# Patient Record
Sex: FEMALE | Race: WHITE | Employment: UNEMPLOYED | URBAN - NONMETROPOLITAN AREA
[De-identification: names, ages, dates, MRNs, and addresses within clinical notes are randomized per-mention and may not be internally consistent; named-entity substitution may affect disease eponyms.]

---

## 2020-09-16 SDOH — HEALTH STABILITY: MENTAL HEALTH: HOW OFTEN DO YOU HAVE A DRINK CONTAINING ALCOHOL?: NEVER

## 2020-09-17 ENCOUNTER — OFFICE VISIT (OUTPATIENT)
Dept: OBGYN CLINIC | Age: 35
End: 2020-09-17
Payer: COMMERCIAL

## 2020-09-17 ENCOUNTER — HOSPITAL ENCOUNTER (OUTPATIENT)
Age: 35
Setting detail: SPECIMEN
Discharge: HOME | End: 2020-09-17
Payer: COMMERCIAL

## 2020-09-17 VITALS
BODY MASS INDEX: 37.99 KG/M2 | SYSTOLIC BLOOD PRESSURE: 98 MMHG | HEIGHT: 65 IN | WEIGHT: 228 LBS | DIASTOLIC BLOOD PRESSURE: 64 MMHG

## 2020-09-17 LAB
ABSOLUTE EOS #: 0.14 K/UL (ref 0–0.44)
ABSOLUTE IMMATURE GRANULOCYTE: 0.04 K/UL (ref 0–0.3)
ABSOLUTE LYMPH #: 2.49 K/UL (ref 1.1–3.7)
ABSOLUTE MONO #: 0.59 K/UL (ref 0.1–1.2)
BASOPHILS # BLD: 1 % (ref 0–2)
BASOPHILS ABSOLUTE: 0.04 K/UL (ref 0–0.2)
DIFFERENTIAL TYPE: ABNORMAL
EOSINOPHILS RELATIVE PERCENT: 2 % (ref 1–4)
HCT VFR BLD CALC: 39.4 % (ref 36.3–47.1)
HEMOGLOBIN: 13 G/DL (ref 11.9–15.1)
IMMATURE GRANULOCYTES: 1 %
LYMPHOCYTES # BLD: 32 % (ref 24–43)
MCH RBC QN AUTO: 27.8 PG (ref 25.2–33.5)
MCHC RBC AUTO-ENTMCNC: 33 G/DL (ref 28.4–34.8)
MCV RBC AUTO: 84.4 FL (ref 82.6–102.9)
MONOCYTES # BLD: 8 % (ref 3–12)
NRBC AUTOMATED: 0 PER 100 WBC
PDW BLD-RTO: 13 % (ref 11.8–14.4)
PLATELET # BLD: 265 K/UL (ref 138–453)
PLATELET ESTIMATE: ABNORMAL
PMV BLD AUTO: 10 FL (ref 8.1–13.5)
RBC # BLD: 4.67 M/UL (ref 3.95–5.11)
RBC # BLD: ABNORMAL 10*6/UL
SEG NEUTROPHILS: 56 % (ref 36–65)
SEGMENTED NEUTROPHILS ABSOLUTE COUNT: 4.59 K/UL (ref 1.5–8.1)
WBC # BLD: 7.9 K/UL (ref 3.5–11.3)
WBC # BLD: ABNORMAL 10*3/UL

## 2020-09-17 PROCEDURE — 99395 PREV VISIT EST AGE 18-39: CPT | Performed by: OBSTETRICS & GYNECOLOGY

## 2020-09-17 RX ORDER — METFORMIN HYDROCHLORIDE 750 MG/1
TABLET, EXTENDED RELEASE ORAL
COMMUNITY
Start: 2020-06-24

## 2020-09-17 RX ORDER — CITALOPRAM 20 MG/1
20 TABLET ORAL DAILY
COMMUNITY
Start: 2020-09-16

## 2020-09-17 RX ORDER — TOPIRAMATE 25 MG/1
25 TABLET ORAL 2 TIMES DAILY
COMMUNITY

## 2020-09-17 RX ORDER — FAMOTIDINE 20 MG/1
20 TABLET, FILM COATED ORAL DAILY
COMMUNITY

## 2020-09-17 RX ORDER — CETIRIZINE HYDROCHLORIDE 10 MG/1
TABLET ORAL
COMMUNITY
Start: 2020-08-17

## 2020-09-17 RX ORDER — BUPROPION HYDROCHLORIDE 300 MG/1
300 TABLET ORAL DAILY
COMMUNITY
Start: 2020-09-16 | End: 2020-09-17

## 2020-09-17 RX ORDER — SUMATRIPTAN 25 MG/1
TABLET, FILM COATED ORAL
COMMUNITY
Start: 2020-09-08

## 2020-09-17 RX ORDER — MONTELUKAST SODIUM 10 MG/1
TABLET ORAL
COMMUNITY
Start: 2020-07-06 | End: 2020-12-28 | Stop reason: ALTCHOICE

## 2020-09-17 RX ORDER — NICOTINE POLACRILEX 4 MG/1
GUM, CHEWING ORAL
COMMUNITY
Start: 2020-09-04 | End: 2020-12-28 | Stop reason: ALTCHOICE

## 2020-09-17 RX ORDER — UREA 10 %
LOTION (ML) TOPICAL
COMMUNITY
Start: 2020-06-22

## 2020-09-17 RX ORDER — ONDANSETRON 4 MG/1
TABLET, FILM COATED ORAL
COMMUNITY
Start: 2020-09-08

## 2020-09-17 NOTE — PROGRESS NOTES
DATE OF VISIT:  20  PATIENT NAME:  Fidel Moffett     YOB: 1985    37 y.o. Chief Complaint   Patient presents with    Annual Exam     last pap 2019 NL/Neg HPV. Pt. c/o migraines the week before her period starts.  Irregular Menses     Pt. c/o heavy periods that are painful. Patient's last menstrual period was 2020. Primary Care Physician: Alexander Knox    The patient was seen and examined. She has no chiefcomplaint today and is here for her annual exam.  Her bowels are regular. There are no voiding complaints. She denies any bloating. She denies vaginal discharge and was counseled on STD's and the need for barriercontraception.      HPI : Fidel Moffett is a 37 y.o. female [place] who presents today for her annual.    ______________________________________________________________________    OB History    Para Term  AB Living   0 0 0 0 0 0   SAB TAB Ectopic Molar Multiple Live Births   0 0 0 0 0 0     Past Medical History:   Diagnosis Date    Acid reflux     Anxiety     Depression     IBS (irritable bowel syndrome)     Migraines     Psoriasis     Vertigo     Vitamin deficiency                                                                    Past Surgical History:   Procedure Laterality Date    CHOLECYSTECTOMY  2013    WISDOM TOOTH EXTRACTION       Family History   Problem Relation Age of Onset    Diabetes Father     Lung Cancer Maternal Grandfather     Brain Cancer Maternal Uncle      Social History     Socioeconomic History    Marital status:      Spouse name: Not on file    Number of children: Not on file    Years of education: Not on file    Highest education level: Not on file   Occupational History    Not on file   Social Needs    Financial resource strain: Not on file    Food insecurity     Worry: Not on file     Inability: Not on file    Transportation needs     Medical: Not on file     Non-medical: Not on file Tobacco Use    Smoking status: Never Smoker    Smokeless tobacco: Never Used   Substance and Sexual Activity    Alcohol use: Never     Frequency: Never    Drug use: Never    Sexual activity: Yes     Partners: Male   Lifestyle    Physical activity     Days per week: Not on file     Minutes per session: Not on file    Stress: Not on file   Relationships    Social connections     Talks on phone: Not on file     Gets together: Not on file     Attends Jewish service: Not on file     Active member of club or organization: Not on file     Attends meetings of clubs or organizations: Not on file     Relationship status: Not on file    Intimate partner violence     Fear of current or ex partner: Not on file     Emotionally abused: Not on file     Physically abused: Not on file     Forced sexual activity: Not on file   Other Topics Concern    Not on file   Social History Narrative    Not on file     Vitals:    09/17/20 1625   BP: 98/64   Site: Right Upper Arm   Position: Sitting   Weight: 228 lb (103.4 kg)   Height: 5' 5\" (1.651 m)     Body mass index is 37.94 kg/m². Patient's last menstrual period was 09/02/2020. MEDICATIONS:  Current Outpatient Medications   Medication Sig Dispense Refill    cetirizine (ZYRTEC) 10 MG tablet TK 1 T PO BID      Cyanocobalamin (VITAMIN B12) 1000 MCG TBCR TK 1 T PO D      famotidine (PEPCID) 20 MG tablet Take 20 mg by mouth daily      melatonin 1 MG tablet TAKE 1 TABLET(1 MG) BY MOUTH EVERY NIGHT      metFORMIN (GLUCOPHAGE-XR) 750 MG extended release tablet TK 1 T PO BID      montelukast (SINGULAIR) 10 MG tablet TK 1 T PO QPM      omeprazole 20 MG EC tablet TK 1 T PO D      ondansetron (ZOFRAN) 4 MG tablet TK 1 T PO Q 8 H PRF NAUSEA OR VOM      topiramate (TOPAMAX) 25 MG tablet Take 25 mg by mouth 2 times daily      citalopram (CELEXA) 20 MG tablet Take 20 mg by mouth daily      SUMAtriptan (IMITREX) 25 MG tablet TK 1 T PO D PRF MIGRAINE HEADACHE.  MAY REPEAT DOSE AFTER 2 H UP TO A. MAXIMUM  MG IN 24 H      Norgestimate-Eth Estradiol (SPRINTEC 28 PO) Take by mouth      BUPROPION HCL ER, SR, PO Take by mouth      DOXEPIN HCL PO Take by mouth      PRAZOSIN HCL PO Take by mouth       No current facility-administered medications for this visit. ALLERGIES:  Allergies as of 09/17/2020 - Review Complete 09/17/2020   Allergen Reaction Noted    Nsaids  09/17/2020           Symptoms of decreased mood absent - is on medication for anxiety    **If either question is answered in a  positive fashion then completethe PHQ9 Scoring Evaluation and make the appropriate referral**      Gynecologic History:        Patient's last menstrual period was 09/02/2020. Sexually Active: Yes    STD History: No     Permanent Sterilization:No   Reversible Birth Control: Yes was put on ocp for menorrhagia - symptoms better but still not controlled        Hormone Replacement Exposure: No      Genetic Qualified Family History of Breast, Ovarian , Colon or Uterine Cancer:No   If YES see scanned worksheet. Preventative Health Testing:  Colposcopy History:   Date of Last Mammogram: na  Date of Last Colonoscopy:   Date of Last Bone Density:      ______________________________________________________________________    REVIEW OF SYSTEMS:       Review of Systems   Genitourinary: Positive for menstrual problem (heavy with clotting despite ocp) and pelvic pain (cramping wtih cycle). Neurological: Positive for headaches (before and during menses x last 2 years). PHYSICAL EXAM:    Physical Exam  Constitutional:       Appearance: She is well-developed. HENT:      Head: Normocephalic and atraumatic. Eyes:      Pupils: Pupils are equal, round, and reactive to light. Neck:      Musculoskeletal: Normal range of motion and neck supple. Cardiovascular:      Rate and Rhythm: Normal rate.    Pulmonary:      Effort: Pulmonary effort is normal.   Chest:      Breasts: Breasts are symmetrical. Right: Normal. No inverted nipple, mass, nipple discharge, skin change or tenderness. Left: Normal. No inverted nipple, mass, nipple discharge, skin change or tenderness. Abdominal:      General: There is no distension. Palpations: Abdomen is soft. There is no mass. Tenderness: There is no abdominal tenderness. There is no guarding or rebound. Genitourinary:     General: Normal vulva. Exam position: Lithotomy position. Vagina: Normal.      Cervix: Normal.      Uterus: Normal.       Adnexa: Right adnexa normal and left adnexa normal.   Musculoskeletal: Normal range of motion. Skin:     General: Skin is warm and dry. Neurological:      Mental Status: She is alert and oriented to person, place, and time. Psychiatric:         Behavior: Behavior normal.         Thought Content: Thought content normal.         Judgment: Judgment normal.         POC Cultures:  No results found for this visit on 09/17/20. ASSESSMENT:      37 y.o. Annual  1. Women's annual routine gynecological examination    2. Menorrhagia with regular cycle          There are no active problems to display for this patient. Hereditary Breast, Ovarian, Colon and Uterine Cancer screening Done. Tobacco & Secondary smoke risks reviewed; instructed on cessation and avoidance    PLAN:    Return in about 2 weeks (around 10/1/2020) for usn, follow up. Orders Placed This Encounter   Procedures    CBC With Auto Differential       Repeat Annual every 1 year  Cervical Cytology Evaluation begins at 24years old. If Negative Cytology, Follow-up screening per current guidelines. Mammograms every 1year. If 37 yo and last mammogram was negative. Calcium and Vitamin D dosing reviewed. Colonoscopy screening reviewed as well as onset for bone density testing. Birth control and barrier recommendationsdiscussed. STD counseling and prevention reviewed.   Gardisil counseling completed for all patients 7-35 yo. Routine healthmaintenance per patients PCP.     Electronicallysigned by:  Eunice Aguiar DO on 09/17/20

## 2020-10-15 ENCOUNTER — TELEPHONE (OUTPATIENT)
Dept: OBGYN CLINIC | Age: 35
End: 2020-10-15

## 2020-10-15 NOTE — TELEPHONE ENCOUNTER
Left voicemail to remind her of ultrasound on 10-19-20 at 2:30pm and a provider appointment with Levar Prather at 3:15pm.

## 2020-10-19 ENCOUNTER — OFFICE VISIT (OUTPATIENT)
Dept: OBGYN CLINIC | Age: 35
End: 2020-10-19
Payer: COMMERCIAL

## 2020-10-19 ENCOUNTER — TELEPHONE (OUTPATIENT)
Dept: OBGYN CLINIC | Age: 35
End: 2020-10-19

## 2020-10-19 VITALS
SYSTOLIC BLOOD PRESSURE: 124 MMHG | HEIGHT: 65 IN | WEIGHT: 227 LBS | BODY MASS INDEX: 37.82 KG/M2 | DIASTOLIC BLOOD PRESSURE: 72 MMHG

## 2020-10-19 PROBLEM — F33.1 MODERATE EPISODE OF RECURRENT MAJOR DEPRESSIVE DISORDER (HCC): Status: ACTIVE | Noted: 2019-06-12

## 2020-10-19 PROBLEM — F43.10 POST TRAUMATIC STRESS DISORDER (PTSD): Status: ACTIVE | Noted: 2019-06-12

## 2020-10-19 PROCEDURE — 99213 OFFICE O/P EST LOW 20 MIN: CPT | Performed by: ADVANCED PRACTICE MIDWIFE

## 2020-10-19 RX ORDER — ERGOCALCIFEROL 1.25 MG/1
CAPSULE ORAL
COMMUNITY
Start: 2020-10-18

## 2020-10-19 RX ORDER — NORETHINDRONE ACETATE AND ETHINYL ESTRADIOL 1.5-30(21)
1 KIT ORAL DAILY
Qty: 1 PACKET | Refills: 12 | Status: ON HOLD | OUTPATIENT
Start: 2020-10-19 | End: 2021-01-08 | Stop reason: HOSPADM

## 2020-10-19 ASSESSMENT — ENCOUNTER SYMPTOMS
GASTROINTESTINAL NEGATIVE: 1
RESPIRATORY NEGATIVE: 1

## 2020-10-19 NOTE — PROGRESS NOTES
PROBLEM VISIT     Date of service: 10/19/2020    Sindy Montgomery  Is a 37 y.o.  female    PT's PCP is: Arcelia Anna     : 1985                                          Review of Systems   Constitutional: Negative. HENT: Negative. Respiratory: Negative. Cardiovascular: Negative. Gastrointestinal: Negative. Genitourinary: Positive for menstrual problem (Heavy menses, dysmenorrhea) and pelvic pain. Musculoskeletal: Negative. Neurological: Positive for headaches (Menstrual headaches). Psychiatric/Behavioral: Negative. Patient's last menstrual period was 2020.    OB History    Para Term  AB Living   0 0 0 0 0 0   SAB TAB Ectopic Molar Multiple Live Births   0 0 0 0 0 0        Social History     Tobacco Use   Smoking Status Never Smoker   Smokeless Tobacco Never Used        Social History     Substance and Sexual Activity   Alcohol Use Never    Frequency: Never       Allergies: Nsaids      Current Outpatient Medications:     norethindrone-ethinyl estradiol-iron (LOESTRIN FE ) 1.5-30 MG-MCG tablet, Take 1 tablet by mouth daily, Disp: 1 packet, Rfl: 12    cetirizine (ZYRTEC) 10 MG tablet, TK 1 T PO BID, Disp: , Rfl:     Cyanocobalamin (VITAMIN B12) 1000 MCG TBCR, TK 1 T PO D, Disp: , Rfl:     famotidine (PEPCID) 20 MG tablet, Take 20 mg by mouth daily, Disp: , Rfl:     melatonin 1 MG tablet, TAKE 1 TABLET(1 MG) BY MOUTH EVERY NIGHT, Disp: , Rfl:     metFORMIN (GLUCOPHAGE-XR) 750 MG extended release tablet, TK 1 T PO BID, Disp: , Rfl:     montelukast (SINGULAIR) 10 MG tablet, TK 1 T PO QPM, Disp: , Rfl:     omeprazole 20 MG EC tablet, TK 1 T PO D, Disp: , Rfl:     ondansetron (ZOFRAN) 4 MG tablet, TK 1 T PO Q 8 H PRF NAUSEA OR VOM, Disp: , Rfl:     topiramate (TOPAMAX) 25 MG tablet, Take 25 mg by mouth 2 times daily, Disp: , Rfl:     citalopram (CELEXA) 20 MG tablet, Take 20 mg by mouth daily, Disp: , Rfl:     SUMAtriptan (IMITREX) 25 MG tablet, TK 1 T PO D PRF MIGRAINE HEADACHE. MAY REPEAT DOSE AFTER 2 H UP TO A. MAXIMUM  MG IN 24 H, Disp: , Rfl:     Norgestimate-Eth Estradiol (SPRINTEC 28 PO), Take by mouth, Disp: , Rfl:     BUPROPION HCL ER, SR, PO, Take by mouth, Disp: , Rfl:     DOXEPIN HCL PO, Take by mouth, Disp: , Rfl:     PRAZOSIN HCL PO, Take by mouth, Disp: , Rfl:     vitamin D (ERGOCALCIFEROL) 1.25 MG (40867 UT) CAPS capsule, , Disp: , Rfl:     Social History     Substance and Sexual Activity   Sexual Activity Yes    Partners: Male       Last Yearly:  2020      Chief Complaint   Patient presents with    Menstrual Problem     Pt. c/o heavy painful periods. Periods occur reguarly. At times bleeding is so heavy that she cannot leave the house. Can bleed through a super plus tampon ever few hours. Also has cramping with her period. Periods are also a migraine trigger for her. Physical Exam  Constitutional:       Appearance: Normal appearance. HENT:      Head: Normocephalic. Eyes:      Pupils: Pupils are equal, round, and reactive to light. Neck:      Musculoskeletal: Normal range of motion. Pulmonary:      Effort: Pulmonary effort is normal.   Musculoskeletal: Normal range of motion. Neurological:      Mental Status: She is alert and oriented to person, place, and time. Skin:     General: Skin is warm. Psychiatric:         Behavior: Behavior normal.   Vitals signs and nursing note reviewed. Vital Signs  Blood pressure 124/72, height 5' 5\" (1.651 m), weight 227 lb (103 kg), last menstrual period 09/30/2020. HPI USN f/u which was ordered by Dr Gail Kaba. Patient reports very heavy menses that are also very painful. Ongoing issue. Has minimal relief with OCP use.        Yes PT denies fever, chills, nausea and vomiting                          Results reviewed today:    USN reviewed - normal uterine size, endo lining appropriate for day #20 scan, small uterine fibroid, normal ovaries    Assessment and Plan          Diagnosis Orders   1. Dysmenorrhea     2. Menorrhagia with regular cycle       Discussed options for treatment - increase OCP to one with higher hormone component - she is agreeable, reviewed ACHES and risks. Also discussed surgery as patient \"never wants children and neither does \". Patient would like ablation to try to alleviate her s/s and is aware that recommend tubal ligation with this due to increased risk of uterine rupture. Patient is agreeable. Aware that Eunice Dakins will call her to schedule for 2021        I am having Ragini Hong start on norethindrone-ethinyl estradiol-iron. I am also having her maintain her Norgestimate-Eth Estradiol (SPRINTEC 28 PO), (BUPROPION HCL ER, SR, PO), DOXEPIN HCL PO, PRAZOSIN HCL PO, cetirizine, Vitamin B12, famotidine, melatonin, metFORMIN, montelukast, omeprazole, ondansetron, topiramate, citalopram, SUMAtriptan, and vitamin D. Return if symptoms worsen or fail to improve. There are no Patient Instructions on file for this visit. Over 50% of time spent on counseling and care coordination on: see assessment and plan,  She was also counseled on her preventative health maintenance recommendations and follow-up.         FF time: 15 min      DAMION NATARAJAN,10/19/2020 3:05 PM       Electronically signed by NUNU Gil CNM

## 2020-11-05 NOTE — TELEPHONE ENCOUNTER
Spoke to patient, she is scheduled for a Lap b/l Salpingectomy, Jie Pulido at [facility] on 1/8/21. She is aware that she will come in to see Dr. Iisah Harrell prior to surgery and will get labs at that time. We will also follow up approx 4 weeks after surgery. She is a [employer] [facility], so no work note needed. She is aware that I will arrange a nurse from [facility] to call her to complete a phone interview and arrange COVID testing. Appointments scheduled. Patient verbalized understanding, no further questions/concerns voiced.

## 2020-12-28 NOTE — PROGRESS NOTES
Patient instructed on the pre-operative, intra-operative, and post-operative process. Patient instructed on NPO status. Medication instructions and Pre operative instruction sheet reviewed over the phone. Instructed pt to stop taking biotin 7 days prior to surgery. Instructed pt to take topamax, bupropion, celexa, and pepcid with a small sip of water prior to arriving to the hospital the day of surgery.

## 2021-01-04 ENCOUNTER — HOSPITAL ENCOUNTER (OUTPATIENT)
Dept: PREADMISSION TESTING | Age: 36
Setting detail: SPECIMEN
Discharge: HOME | End: 2021-01-08
Payer: COMMERCIAL

## 2021-01-04 ENCOUNTER — HOSPITAL ENCOUNTER (OUTPATIENT)
Age: 36
Discharge: HOME | End: 2021-01-04
Payer: COMMERCIAL

## 2021-01-04 DIAGNOSIS — Z01.818 PREOPERATIVE TESTING: ICD-10-CM

## 2021-01-04 DIAGNOSIS — R10.2 PELVIC PAIN IN FEMALE: ICD-10-CM

## 2021-01-04 DIAGNOSIS — N94.6 DYSMENORRHEA: ICD-10-CM

## 2021-01-04 DIAGNOSIS — Z01.818 PREOPERATIVE TESTING: Primary | ICD-10-CM

## 2021-01-04 DIAGNOSIS — N92.0 MENORRHAGIA WITH REGULAR CYCLE: ICD-10-CM

## 2021-01-04 LAB
ABSOLUTE EOS #: 0.15 K/UL (ref 0–0.44)
ABSOLUTE IMMATURE GRANULOCYTE: 0.04 K/UL (ref 0–0.3)
ABSOLUTE LYMPH #: 2.16 K/UL (ref 1.1–3.7)
ABSOLUTE MONO #: 0.57 K/UL (ref 0.1–1.2)
BASOPHILS # BLD: 0 % (ref 0–2)
BASOPHILS ABSOLUTE: 0.04 K/UL (ref 0–0.2)
DIFFERENTIAL TYPE: ABNORMAL
EOSINOPHILS RELATIVE PERCENT: 2 % (ref 1–4)
HCG QUALITATIVE: NEGATIVE
HCT VFR BLD CALC: 38.8 % (ref 36.3–47.1)
HEMOGLOBIN: 12.7 G/DL (ref 11.9–15.1)
IMMATURE GRANULOCYTES: 0 %
LYMPHOCYTES # BLD: 24 % (ref 24–43)
MCH RBC QN AUTO: 27.7 PG (ref 25.2–33.5)
MCHC RBC AUTO-ENTMCNC: 32.7 G/DL (ref 28.4–34.8)
MCV RBC AUTO: 84.5 FL (ref 82.6–102.9)
MONOCYTES # BLD: 6 % (ref 3–12)
NRBC AUTOMATED: 0 PER 100 WBC
PDW BLD-RTO: 12.8 % (ref 11.8–14.4)
PLATELET # BLD: 225 K/UL (ref 138–453)
PLATELET ESTIMATE: ABNORMAL
PMV BLD AUTO: 9.4 FL (ref 8.1–13.5)
RBC # BLD: 4.59 M/UL (ref 3.95–5.11)
RBC # BLD: ABNORMAL 10*6/UL
SEG NEUTROPHILS: 68 % (ref 36–65)
SEGMENTED NEUTROPHILS ABSOLUTE COUNT: 6.09 K/UL (ref 1.5–8.1)
WBC # BLD: 9.1 K/UL (ref 3.5–11.3)
WBC # BLD: ABNORMAL 10*3/UL

## 2021-01-04 PROCEDURE — 36415 COLL VENOUS BLD VENIPUNCTURE: CPT

## 2021-01-04 PROCEDURE — 85025 COMPLETE CBC W/AUTO DIFF WBC: CPT

## 2021-01-04 PROCEDURE — 84703 CHORIONIC GONADOTROPIN ASSAY: CPT

## 2021-01-04 PROCEDURE — U0003 INFECTIOUS AGENT DETECTION BY NUCLEIC ACID (DNA OR RNA); SEVERE ACUTE RESPIRATORY SYNDROME CORONAVIRUS 2 (SARS-COV-2) (CORONAVIRUS DISEASE [COVID-19]), AMPLIFIED PROBE TECHNIQUE, MAKING USE OF HIGH THROUGHPUT TECHNOLOGIES AS DESCRIBED BY CMS-2020-01-R: HCPCS

## 2021-01-04 PROCEDURE — C9803 HOPD COVID-19 SPEC COLLECT: HCPCS

## 2021-01-05 LAB
SARS-COV-2, RAPID: NORMAL
SARS-COV-2: NORMAL
SARS-COV-2: NOT DETECTED
SOURCE: NORMAL

## 2021-01-06 ENCOUNTER — TELEPHONE (OUTPATIENT)
Dept: PRIMARY CARE CLINIC | Age: 36
End: 2021-01-06

## 2021-01-06 ENCOUNTER — OFFICE VISIT (OUTPATIENT)
Dept: OBGYN CLINIC | Age: 36
End: 2021-01-06
Payer: COMMERCIAL

## 2021-01-06 VITALS
BODY MASS INDEX: 38.32 KG/M2 | HEIGHT: 65 IN | HEART RATE: 97 BPM | SYSTOLIC BLOOD PRESSURE: 119 MMHG | DIASTOLIC BLOOD PRESSURE: 82 MMHG | WEIGHT: 230 LBS

## 2021-01-06 DIAGNOSIS — N92.0 MENORRHAGIA WITH REGULAR CYCLE: Primary | ICD-10-CM

## 2021-01-06 DIAGNOSIS — N94.6 DYSMENORRHEA: ICD-10-CM

## 2021-01-06 PROCEDURE — 99213 OFFICE O/P EST LOW 20 MIN: CPT | Performed by: OBSTETRICS & GYNECOLOGY

## 2021-01-06 NOTE — TELEPHONE ENCOUNTER
Tried to call patient re-guarding negative COVID-19 test result, but phone was not working this day.

## 2021-01-06 NOTE — PROGRESS NOTES
DATE OF VISIT:  1/6/21    PATIENT NAME:  Nitza Hong     YOB: 1985    REASON FOR VISIT:    Chief Complaint   Patient presents with    Pre-op Exam     Pt. is scheduled on 1-8-2021 for lap b/l salpingectomy, hyst D&C, novasure.  Menstrual Problem     c/o heavy painful periods. At times bleeding is so heavy that she she feels unable to leave the house. Can bleed through a super plus tampon every few hours. Has cramping with her period. Also gets some sharp pains that can be 7 out of 10 on pain scale. HISTORY OF PRESENT ILLNESS:  Pt with painful, heavy menses - using super plus tampon q 1- 2 hours; disc'd hysteroscopy d&c with novasure and need for medically indicated b/l salpingectomy; r/b/a disc'd consent obtained      Patient's last menstrual period was 12/23/2020 (exact date). Vitals:    01/06/21 1542   BP: 119/82   Site: Right Upper Arm   Position: Sitting   Pulse: 97   Weight: 230 lb (104.3 kg)   Height: 5' 5\" (1.651 m)     Body mass index is 38.27 kg/m². Allergies   Allergen Reactions    Nsaids      Pt.  Has IBS     Current Outpatient Medications   Medication Sig Dispense Refill    BIOTIN PO Take by mouth daily      vitamin D (ERGOCALCIFEROL) 1.25 MG (67576 UT) CAPS capsule       norethindrone-ethinyl estradiol-iron (LOESTRIN FE 1.5/30) 1.5-30 MG-MCG tablet Take 1 tablet by mouth daily 1 packet 12    cetirizine (ZYRTEC) 10 MG tablet TK 1 T PO BID      Cyanocobalamin (VITAMIN B12) 1000 MCG TBCR TK 1 T PO D      famotidine (PEPCID) 20 MG tablet Take 20 mg by mouth daily      melatonin 1 MG tablet TAKE 1 TABLET(1 MG) BY MOUTH EVERY NIGHT      metFORMIN (GLUCOPHAGE-XR) 750 MG extended release tablet TK 1 T PO BID      ondansetron (ZOFRAN) 4 MG tablet TK 1 T PO Q 8 H PRF NAUSEA OR VOM      topiramate (TOPAMAX) 25 MG tablet Take 25 mg by mouth 2 times daily      citalopram (CELEXA) 20 MG tablet Take 20 mg by mouth daily /82 (Site: Right Upper Arm, Position: Sitting)   Pulse 97   Ht 5' 5\" (1.651 m)   Wt 230 lb (104.3 kg)   LMP 12/23/2020 (Exact Date)   BMI 38.27 kg/m²   Physical Exam  Constitutional:       Appearance: Normal appearance. HENT:      Head: Normocephalic and atraumatic. Eyes:      Pupils: Pupils are equal, round, and reactive to light. Neck:      Musculoskeletal: Normal range of motion. Cardiovascular:      Rate and Rhythm: Normal rate. Musculoskeletal: Normal range of motion. Neurological:      Mental Status: She is alert and oriented to person, place, and time. Skin:     General: Skin is warm and dry. Psychiatric:         Mood and Affect: Mood normal.         Behavior: Behavior normal.         Thought Content: Thought content normal.         Judgment: Judgment normal.         ASSESSMENT:      1. Menorrhagia with regular cycle    2. Dysmenorrhea        PLAN:  No orders of the defined types were placed in this encounter. No follow-ups on file.        Electronically signed by Nick Gatica DO on 01/06/21

## 2021-01-07 ENCOUNTER — ANESTHESIA EVENT (OUTPATIENT)
Dept: OPERATING ROOM | Age: 36
End: 2021-01-07
Payer: COMMERCIAL

## 2021-01-08 ENCOUNTER — HOSPITAL ENCOUNTER (OUTPATIENT)
Age: 36
Setting detail: OUTPATIENT SURGERY
Discharge: HOME | End: 2021-01-08
Attending: OBSTETRICS & GYNECOLOGY | Admitting: OBSTETRICS & GYNECOLOGY
Payer: COMMERCIAL

## 2021-01-08 ENCOUNTER — ANESTHESIA (OUTPATIENT)
Dept: OPERATING ROOM | Age: 36
End: 2021-01-08
Payer: COMMERCIAL

## 2021-01-08 VITALS
DIASTOLIC BLOOD PRESSURE: 75 MMHG | HEIGHT: 65 IN | OXYGEN SATURATION: 97 % | TEMPERATURE: 98.2 F | WEIGHT: 226 LBS | SYSTOLIC BLOOD PRESSURE: 133 MMHG | RESPIRATION RATE: 16 BRPM | HEART RATE: 96 BPM | BODY MASS INDEX: 37.65 KG/M2

## 2021-01-08 VITALS — OXYGEN SATURATION: 98 % | DIASTOLIC BLOOD PRESSURE: 35 MMHG | SYSTOLIC BLOOD PRESSURE: 101 MMHG

## 2021-01-08 DIAGNOSIS — G89.18 POST-OP PAIN: Primary | ICD-10-CM

## 2021-01-08 PROCEDURE — 64488 TAP BLOCK BI INJECTION: CPT | Performed by: NURSE ANESTHETIST, CERTIFIED REGISTERED

## 2021-01-08 PROCEDURE — 2500000003 HC RX 250 WO HCPCS: Performed by: NURSE ANESTHETIST, CERTIFIED REGISTERED

## 2021-01-08 PROCEDURE — 7100000001 HC PACU RECOVERY - ADDTL 15 MIN: Performed by: OBSTETRICS & GYNECOLOGY

## 2021-01-08 PROCEDURE — 7100000011 HC PHASE II RECOVERY - ADDTL 15 MIN: Performed by: OBSTETRICS & GYNECOLOGY

## 2021-01-08 PROCEDURE — 6360000002 HC RX W HCPCS: Performed by: NURSE ANESTHETIST, CERTIFIED REGISTERED

## 2021-01-08 PROCEDURE — 6360000002 HC RX W HCPCS: Performed by: OBSTETRICS & GYNECOLOGY

## 2021-01-08 PROCEDURE — 2580000003 HC RX 258: Performed by: OBSTETRICS & GYNECOLOGY

## 2021-01-08 PROCEDURE — 58563 HYSTEROSCOPY ABLATION: CPT | Performed by: OBSTETRICS & GYNECOLOGY

## 2021-01-08 PROCEDURE — 3600000003 HC SURGERY LEVEL 3 BASE: Performed by: OBSTETRICS & GYNECOLOGY

## 2021-01-08 PROCEDURE — 3700000000 HC ANESTHESIA ATTENDED CARE: Performed by: OBSTETRICS & GYNECOLOGY

## 2021-01-08 PROCEDURE — 3600000013 HC SURGERY LEVEL 3 ADDTL 15MIN: Performed by: OBSTETRICS & GYNECOLOGY

## 2021-01-08 PROCEDURE — 2720000010 HC SURG SUPPLY STERILE: Performed by: OBSTETRICS & GYNECOLOGY

## 2021-01-08 PROCEDURE — 3700000001 HC ADD 15 MINUTES (ANESTHESIA): Performed by: OBSTETRICS & GYNECOLOGY

## 2021-01-08 PROCEDURE — 88302 TISSUE EXAM BY PATHOLOGIST: CPT

## 2021-01-08 PROCEDURE — 2709999900 HC NON-CHARGEABLE SUPPLY: Performed by: OBSTETRICS & GYNECOLOGY

## 2021-01-08 PROCEDURE — 6370000000 HC RX 637 (ALT 250 FOR IP): Performed by: OBSTETRICS & GYNECOLOGY

## 2021-01-08 PROCEDURE — 7100000000 HC PACU RECOVERY - FIRST 15 MIN: Performed by: OBSTETRICS & GYNECOLOGY

## 2021-01-08 PROCEDURE — 88305 TISSUE EXAM BY PATHOLOGIST: CPT

## 2021-01-08 PROCEDURE — 58661 LAPAROSCOPY REMOVE ADNEXA: CPT | Performed by: OBSTETRICS & GYNECOLOGY

## 2021-01-08 PROCEDURE — 7100000010 HC PHASE II RECOVERY - FIRST 15 MIN: Performed by: OBSTETRICS & GYNECOLOGY

## 2021-01-08 PROCEDURE — 6370000000 HC RX 637 (ALT 250 FOR IP): Performed by: NURSE ANESTHETIST, CERTIFIED REGISTERED

## 2021-01-08 RX ORDER — HYDROCODONE BITARTRATE AND ACETAMINOPHEN 5; 325 MG/1; MG/1
2 TABLET ORAL PRN
Status: COMPLETED | OUTPATIENT
Start: 2021-01-08 | End: 2021-01-08

## 2021-01-08 RX ORDER — LIDOCAINE HYDROCHLORIDE 20 MG/ML
INJECTION, SOLUTION EPIDURAL; INFILTRATION; INTRACAUDAL; PERINEURAL PRN
Status: DISCONTINUED | OUTPATIENT
Start: 2021-01-08 | End: 2021-01-08 | Stop reason: SDUPTHER

## 2021-01-08 RX ORDER — FENTANYL CITRATE 50 UG/ML
50 INJECTION, SOLUTION INTRAMUSCULAR; INTRAVENOUS EVERY 5 MIN PRN
Status: DISCONTINUED | OUTPATIENT
Start: 2021-01-08 | End: 2021-01-08 | Stop reason: HOSPADM

## 2021-01-08 RX ORDER — DEXAMETHASONE SODIUM PHOSPHATE 4 MG/ML
INJECTION, SOLUTION INTRA-ARTICULAR; INTRALESIONAL; INTRAMUSCULAR; INTRAVENOUS; SOFT TISSUE PRN
Status: DISCONTINUED | OUTPATIENT
Start: 2021-01-08 | End: 2021-01-08 | Stop reason: SDUPTHER

## 2021-01-08 RX ORDER — MIDAZOLAM HYDROCHLORIDE 1 MG/ML
INJECTION INTRAMUSCULAR; INTRAVENOUS PRN
Status: DISCONTINUED | OUTPATIENT
Start: 2021-01-08 | End: 2021-01-08 | Stop reason: SDUPTHER

## 2021-01-08 RX ORDER — ROCURONIUM BROMIDE 10 MG/ML
INJECTION, SOLUTION INTRAVENOUS PRN
Status: DISCONTINUED | OUTPATIENT
Start: 2021-01-08 | End: 2021-01-08 | Stop reason: SDUPTHER

## 2021-01-08 RX ORDER — DEXAMETHASONE SODIUM PHOSPHATE 10 MG/ML
INJECTION, SOLUTION INTRAMUSCULAR; INTRAVENOUS PRN
Status: DISCONTINUED | OUTPATIENT
Start: 2021-01-08 | End: 2021-01-08 | Stop reason: SDUPTHER

## 2021-01-08 RX ORDER — METOCLOPRAMIDE HYDROCHLORIDE 5 MG/ML
10 INJECTION INTRAMUSCULAR; INTRAVENOUS
Status: DISCONTINUED | OUTPATIENT
Start: 2021-01-08 | End: 2021-01-08 | Stop reason: HOSPADM

## 2021-01-08 RX ORDER — FENTANYL CITRATE 50 UG/ML
INJECTION, SOLUTION INTRAMUSCULAR; INTRAVENOUS PRN
Status: DISCONTINUED | OUTPATIENT
Start: 2021-01-08 | End: 2021-01-08 | Stop reason: SDUPTHER

## 2021-01-08 RX ORDER — FENTANYL CITRATE 50 UG/ML
25 INJECTION, SOLUTION INTRAMUSCULAR; INTRAVENOUS EVERY 5 MIN PRN
Status: DISCONTINUED | OUTPATIENT
Start: 2021-01-08 | End: 2021-01-08 | Stop reason: HOSPADM

## 2021-01-08 RX ORDER — HYDROCODONE BITARTRATE AND ACETAMINOPHEN 5; 325 MG/1; MG/1
1 TABLET ORAL EVERY 4 HOURS PRN
Qty: 12 TABLET | Refills: 0 | Status: SHIPPED | OUTPATIENT
Start: 2021-01-08 | End: 2021-01-10

## 2021-01-08 RX ORDER — DIMENHYDRINATE 50 MG/1
50 TABLET ORAL ONCE
Status: COMPLETED | OUTPATIENT
Start: 2021-01-08 | End: 2021-01-08

## 2021-01-08 RX ORDER — PROPOFOL 10 MG/ML
INJECTION, EMULSION INTRAVENOUS PRN
Status: DISCONTINUED | OUTPATIENT
Start: 2021-01-08 | End: 2021-01-08 | Stop reason: SDUPTHER

## 2021-01-08 RX ORDER — ACETAMINOPHEN 325 MG/1
650 TABLET ORAL ONCE
Status: COMPLETED | OUTPATIENT
Start: 2021-01-08 | End: 2021-01-08

## 2021-01-08 RX ORDER — KETOROLAC TROMETHAMINE 30 MG/ML
INJECTION, SOLUTION INTRAMUSCULAR; INTRAVENOUS PRN
Status: DISCONTINUED | OUTPATIENT
Start: 2021-01-08 | End: 2021-01-08 | Stop reason: SDUPTHER

## 2021-01-08 RX ORDER — SODIUM CHLORIDE 0.9 % (FLUSH) 0.9 %
10 SYRINGE (ML) INJECTION PRN
Status: DISCONTINUED | OUTPATIENT
Start: 2021-01-08 | End: 2021-01-08 | Stop reason: HOSPADM

## 2021-01-08 RX ORDER — ATROPA BELLADONNA AND OPIUM 16.2; 3 MG/1; MG/1
SUPPOSITORY RECTAL PRN
Status: DISCONTINUED | OUTPATIENT
Start: 2021-01-08 | End: 2021-01-08 | Stop reason: SDUPTHER

## 2021-01-08 RX ORDER — PHENAZOPYRIDINE HYDROCHLORIDE 100 MG/1
200 TABLET, FILM COATED ORAL ONCE
Status: COMPLETED | OUTPATIENT
Start: 2021-01-08 | End: 2021-01-08

## 2021-01-08 RX ORDER — SODIUM CHLORIDE, SODIUM LACTATE, POTASSIUM CHLORIDE, CALCIUM CHLORIDE 600; 310; 30; 20 MG/100ML; MG/100ML; MG/100ML; MG/100ML
INJECTION, SOLUTION INTRAVENOUS CONTINUOUS
Status: DISCONTINUED | OUTPATIENT
Start: 2021-01-08 | End: 2021-01-08 | Stop reason: HOSPADM

## 2021-01-08 RX ORDER — GLYCOPYRROLATE 1 MG/5 ML
SYRINGE (ML) INTRAVENOUS PRN
Status: DISCONTINUED | OUTPATIENT
Start: 2021-01-08 | End: 2021-01-08 | Stop reason: SDUPTHER

## 2021-01-08 RX ORDER — ESMOLOL HYDROCHLORIDE 10 MG/ML
INJECTION INTRAVENOUS PRN
Status: DISCONTINUED | OUTPATIENT
Start: 2021-01-08 | End: 2021-01-08 | Stop reason: SDUPTHER

## 2021-01-08 RX ORDER — CLONIDINE 100 UG/ML
INJECTION, SOLUTION EPIDURAL PRN
Status: DISCONTINUED | OUTPATIENT
Start: 2021-01-08 | End: 2021-01-08 | Stop reason: SDUPTHER

## 2021-01-08 RX ORDER — HYDROCODONE BITARTRATE AND ACETAMINOPHEN 5; 325 MG/1; MG/1
1 TABLET ORAL PRN
Status: COMPLETED | OUTPATIENT
Start: 2021-01-08 | End: 2021-01-08

## 2021-01-08 RX ORDER — ONDANSETRON 2 MG/ML
4 INJECTION INTRAMUSCULAR; INTRAVENOUS
Status: COMPLETED | OUTPATIENT
Start: 2021-01-08 | End: 2021-01-08

## 2021-01-08 RX ORDER — ONDANSETRON 2 MG/ML
INJECTION INTRAMUSCULAR; INTRAVENOUS PRN
Status: DISCONTINUED | OUTPATIENT
Start: 2021-01-08 | End: 2021-01-08 | Stop reason: SDUPTHER

## 2021-01-08 RX ORDER — SODIUM CHLORIDE 0.9 % (FLUSH) 0.9 %
10 SYRINGE (ML) INJECTION EVERY 12 HOURS SCHEDULED
Status: DISCONTINUED | OUTPATIENT
Start: 2021-01-08 | End: 2021-01-08 | Stop reason: HOSPADM

## 2021-01-08 RX ORDER — ROPIVACAINE HYDROCHLORIDE 5 MG/ML
INJECTION, SOLUTION EPIDURAL; INFILTRATION; PERINEURAL PRN
Status: DISCONTINUED | OUTPATIENT
Start: 2021-01-08 | End: 2021-01-08 | Stop reason: SDUPTHER

## 2021-01-08 RX ADMIN — FENTANYL CITRATE 50 MCG: 50 INJECTION, SOLUTION INTRAMUSCULAR; INTRAVENOUS at 09:34

## 2021-01-08 RX ADMIN — KETOROLAC TROMETHAMINE 15 MG: 30 INJECTION, SOLUTION INTRAMUSCULAR; INTRAVENOUS at 10:50

## 2021-01-08 RX ADMIN — ONDANSETRON 4 MG: 2 INJECTION INTRAMUSCULAR; INTRAVENOUS at 10:09

## 2021-01-08 RX ADMIN — DEXTROSE MONOHYDRATE 2 G: 50 INJECTION, SOLUTION INTRAVENOUS at 09:50

## 2021-01-08 RX ADMIN — PROPOFOL 200 MG: 10 INJECTION, EMULSION INTRAVENOUS at 09:51

## 2021-01-08 RX ADMIN — MIDAZOLAM 2 MG: 1 INJECTION INTRAMUSCULAR; INTRAVENOUS at 09:29

## 2021-01-08 RX ADMIN — DEXAMETHASONE SODIUM PHOSPHATE 8 MG: 4 INJECTION, SOLUTION INTRAMUSCULAR; INTRAVENOUS at 10:24

## 2021-01-08 RX ADMIN — ESMOLOL HYDROCHLORIDE 50 MG: 10 INJECTION, SOLUTION INTRAVENOUS at 09:51

## 2021-01-08 RX ADMIN — Medication 3 MG: at 10:36

## 2021-01-08 RX ADMIN — ROPIVACAINE HYDROCHLORIDE 60 ML: 5 INJECTION, SOLUTION EPIDURAL; INFILTRATION; PERINEURAL at 09:29

## 2021-01-08 RX ADMIN — Medication 0.4 MG: at 10:39

## 2021-01-08 RX ADMIN — DEXAMETHASONE SODIUM PHOSPHATE 10 MG: 10 INJECTION, SOLUTION INTRAMUSCULAR; INTRAVENOUS at 09:38

## 2021-01-08 RX ADMIN — FENTANYL CITRATE 50 MCG: 50 INJECTION, SOLUTION INTRAMUSCULAR; INTRAVENOUS at 09:32

## 2021-01-08 RX ADMIN — ATROPA BELLADONNA AND OPIUM 30 MG: 16.2; 3 SUPPOSITORY RECTAL at 10:10

## 2021-01-08 RX ADMIN — CLONIDINE HYDROCHLORIDE 100 MCG: 100 INJECTION, SOLUTION INTRAVENOUS at 09:29

## 2021-01-08 RX ADMIN — ONDANSETRON 4 MG: 2 INJECTION INTRAMUSCULAR; INTRAVENOUS at 12:42

## 2021-01-08 RX ADMIN — SODIUM CHLORIDE, POTASSIUM CHLORIDE, SODIUM LACTATE AND CALCIUM CHLORIDE: 600; 310; 30; 20 INJECTION, SOLUTION INTRAVENOUS at 08:24

## 2021-01-08 RX ADMIN — LIDOCAINE HYDROCHLORIDE 5 ML: 20 INJECTION, SOLUTION EPIDURAL; INFILTRATION; INTRACAUDAL; PERINEURAL at 09:51

## 2021-01-08 RX ADMIN — PROPOFOL 50 MG: 10 INJECTION, EMULSION INTRAVENOUS at 10:48

## 2021-01-08 RX ADMIN — PHENAZOPYRIDINE HYDROCHLORIDE 200 MG: 100 TABLET ORAL at 12:13

## 2021-01-08 RX ADMIN — SODIUM CHLORIDE, POTASSIUM CHLORIDE, SODIUM LACTATE AND CALCIUM CHLORIDE: 600; 310; 30; 20 INJECTION, SOLUTION INTRAVENOUS at 10:28

## 2021-01-08 RX ADMIN — HYDROCODONE BITARTRATE AND ACETAMINOPHEN 2 TABLET: 5; 325 TABLET ORAL at 12:06

## 2021-01-08 RX ADMIN — ONDANSETRON 4 MG: 2 INJECTION INTRAMUSCULAR; INTRAVENOUS at 10:24

## 2021-01-08 RX ADMIN — ACETAMINOPHEN 650 MG: 325 TABLET, FILM COATED ORAL at 08:20

## 2021-01-08 RX ADMIN — DIMENHYDRINATE 50 MG: 50 TABLET ORAL at 08:20

## 2021-01-08 RX ADMIN — ROCURONIUM BROMIDE 40 MG: 10 INJECTION, SOLUTION INTRAVENOUS at 09:51

## 2021-01-08 ASSESSMENT — LIFESTYLE VARIABLES: SMOKING_STATUS: 0

## 2021-01-08 ASSESSMENT — PAIN SCALES - GENERAL: PAINLEVEL_OUTOF10: 0

## 2021-01-08 ASSESSMENT — PAIN DESCRIPTION - DESCRIPTORS: DESCRIPTORS: CRAMPING

## 2021-01-08 ASSESSMENT — PAIN DESCRIPTION - LOCATION: LOCATION: ABDOMEN

## 2021-01-08 ASSESSMENT — PAIN DESCRIPTION - PAIN TYPE: TYPE: SURGICAL PAIN

## 2021-01-08 NOTE — H&P
tablet TK 1 T PO D PRF MIGRAINE HEADACHE. MAY REPEAT DOSE AFTER 2 H UP TO A. MAXIMUM  MG IN 24 H 9/8/20  Yes Historical Provider, MD   BUPROPION HCL ER, SR, PO Take by mouth   Yes Historical Provider, MD        Allergies   Nsaids    Social History     Social History     Tobacco History     Smoking Status  Never Smoker    Smokeless Tobacco Use  Never Used          Alcohol History     Alcohol Use Status  Never          Drug Use     Drug Use Status  Never          Sexual Activity     Sexually Active  Yes Partners  Male                Family History     Family History   Problem Relation Age of Onset    Diabetes Father     Lung Cancer Maternal Grandfather     Brain Cancer Maternal Uncle        Review of Systems   Review of Systems   Constitutional: Negative for chills and fever. Genitourinary: Positive for menstrual problem and pelvic pain. Physical Exam   /74   Pulse 88   Temp 97.6 °F (36.4 °C) (Temporal)   Resp 18   Ht 5' 5\" (1.651 m)   Wt 226 lb (102.5 kg)   LMP 12/23/2020 (Exact Date)   SpO2 97%   BMI 37.61 kg/m²     Physical Exam  Constitutional:       Appearance: Normal appearance. HENT:      Head: Normocephalic and atraumatic. Eyes:      Extraocular Movements: Extraocular movements intact. Pupils: Pupils are equal, round, and reactive to light. Neck:      Musculoskeletal: Normal range of motion. Cardiovascular:      Rate and Rhythm: Normal rate. Pulmonary:      Effort: Pulmonary effort is normal.   Musculoskeletal: Normal range of motion. Skin:     General: Skin is warm and dry. Neurological:      Mental Status: She is alert and oriented to person, place, and time. Psychiatric:         Mood and Affect: Mood normal.         Thought Content: Thought content normal.         Judgment: Judgment normal.         Labs    No results found for this or any previous visit (from the past 24 hour(s)). Imaging/Diagnostics Last 24 Hours   No results found.     Assessment Menorrhagia  Dysmenorrhea    Plan   1.  Hysteroscopy D&C Novasure with medically indicated laparoscopic bilateral salpingectomy    Consultations Ordered:  None    Electronically signed by Brooks Andersen DO on 1/8/21 at 8:32 AM EST

## 2021-01-08 NOTE — ANESTHESIA PRE PROCEDURE
Medication Dose Route Frequency Provider Last Rate Last Admin    lactated ringers infusion   Intravenous Continuous Scotty Cross,  mL/hr at 01/08/21 0824 New Bag at 01/08/21 0824    sodium chloride flush 0.9 % injection 10 mL  10 mL Intravenous 2 times per day Scotty Cross, DO        sodium chloride flush 0.9 % injection 10 mL  10 mL Intravenous PRN Scotty Cross, DO        ceFAZolin (ANCEF) 2 g in dextrose 5 % 100 mL IVPB  2 g Intravenous On Call to [employer], DO           Allergies: Allergies   Allergen Reactions    Nsaids      Pt.  Has IBS       Problem List:    Patient Active Problem List   Diagnosis Code    Post traumatic stress disorder (PTSD) F43.10    Moderate episode of recurrent major depressive disorder ([facility] [address] F33.1       Past Medical History:        Diagnosis Date    Acid reflux     Anxiety     Depression     H/O Hospitalization 2010    IBS (irritable bowel syndrome)     Migraines     Psoriasis     Vertigo     Vitamin deficiency        Past Surgical History:        Procedure Laterality Date    CHOLECYSTECTOMY  2013    WISDOM TOOTH EXTRACTION         Social History:    Social History     Tobacco Use    Smoking status: Never Smoker    Smokeless tobacco: Never Used   Substance Use Topics    Alcohol use: Never     Frequency: Never                                Counseling given: Not Answered      Vital Signs (Current):   Vitals:    12/28/20 1404 01/08/21 0815   BP:  120/74   Pulse:  88   Resp:  18   Temp:  36.4 °C (97.6 °F)   TempSrc:  Temporal   SpO2:  97%   Weight: 225 lb (102.1 kg) 226 lb (102.5 kg)   Height: 5' 5\" (1.651 m) 5' 5\" (1.651 m)                                              BP Readings from Last 3 Encounters:   01/08/21 120/74   01/06/21 119/82   10/19/20 124/72       NPO Status: Time of last liquid consumption: 7075                        Time of last solid consumption: 1900 Date of last liquid consumption: 01/07/21                        Date of last solid food consumption: 01/07/21    BMI:   Wt Readings from Last 3 Encounters:   01/08/21 226 lb (102.5 kg)   01/06/21 230 lb (104.3 kg)   10/19/20 227 lb (103 kg)     Body mass index is 37.61 kg/m². CBC:   Lab Results   Component Value Date    WBC 9.1 01/04/2021    RBC 4.59 01/04/2021    HGB 12.7 01/04/2021    HCT 38.8 01/04/2021    MCV 84.5 01/04/2021    RDW 12.8 01/04/2021     01/04/2021       CMP: No results found for: NA, K, CL, CO2, BUN, CREATININE, GFRAA, AGRATIO, LABGLOM, GLUCOSE, PROT, CALCIUM, BILITOT, ALKPHOS, AST, ALT    POC Tests: No results for input(s): POCGLU, POCNA, POCK, POCCL, POCBUN, POCHEMO, POCHCT in the last 72 hours. Coags: No results found for: PROTIME, INR, APTT    HCG (If Applicable): No results found for: PREGTESTUR, PREGSERUM, HCG, HCGQUANT     ABGs: No results found for: PHART, PO2ART, NIU8XWN, ESO5TDB, BEART, [id number]     Type & Screen (If Applicable):  No results found for: LABABO, LABRH    Drug/Infectious Status (If Applicable):  No results found for: HIV, HEPCAB    COVID-19 Screening (If Applicable):   Lab Results   Component Value Date    COVID19 Not Detected 01/04/2021         Anesthesia Evaluation  Patient summary reviewed and Nursing notes reviewed no history of anesthetic complications:   Airway: Mallampati: II  TM distance: >3 FB   Neck ROM: full  Mouth opening: > = 3 FB Dental: normal exam         Pulmonary:Negative Pulmonary ROS and normal exam        (-) not a current smoker                           Cardiovascular:  Exercise tolerance: good (>4 METS),                     Neuro/Psych:   (+) headaches: migraine headaches, depression/anxiety             GI/Hepatic/Renal:   (+) GERD: well controlled, morbid obesity          Endo/Other: Negative Endo/Other ROS                    Abdominal:   (+) obese,         Vascular: negative vascular ROS. Anesthesia Plan      general     ASA 3     (Agreed to PNB)  Induction: intravenous. MIPS: Postoperative opioids intended and Prophylactic antiemetics administered. Anesthetic plan and risks discussed with patient and spouse.                     NUNU Saucedo - CRNA   1/8/2021

## 2021-01-08 NOTE — ANESTHESIA POSTPROCEDURE EVALUATION
Department of Anesthesiology  Postprocedure Note    Patient: Chencho Hong  MRN: [id number]  YOB: 1985  Date of evaluation: 1/8/2021  Time:  2:05 PM     Procedure Summary     Date: 01/08/21 Room / Location: [address] [place] [address]    Anesthesia Start: 1658 Anesthesia Stop: 1106    Procedures:       SALPINGECTOMY LAPAROSCOPIC, MEDICALLY INDICATED (Bilateral )      DILATATION AND CURETTAGE HYSTEROSCOPY CAUTERY ABLATION, Harlin Croak (N/A ) Diagnosis: (MENORRHAGIA, DYSMENORRHEA)    Surgeons: Randee Meza DO Responsible Provider: NUNU Cottrell CRNA    Anesthesia Type: general ASA Status: 3          Anesthesia Type: general    Nicole Phase I: Nicole Score: 10    Nicole Phase II: Nicole Score: 10    Last vitals: Reviewed and per EMR flowsheets.        Anesthesia Post Evaluation    Patient location during evaluation: PACU  Patient participation: complete - patient participated  Level of consciousness: awake and alert  Airway patency: patent  Nausea & Vomiting: no nausea and no vomiting  Complications: no  Cardiovascular status: blood pressure returned to baseline and hemodynamically stable  Respiratory status: acceptable and room air  Hydration status: euvolemic

## 2021-01-08 NOTE — OP NOTE
Gynecologic Operative Note        NAME: Kerline Hong   MRN: [id number]  CSN: [id number]  : 1985    PROCEDURE DATE: 2021     Pre-op Diagnosis: MENORRHAGIA, DYSMENORRHEA      Post-op Diagnosis: Same    Procedure: Novasure Hysteroscopic Endometrial Ablation with D&C and Medically Indicated Salpingectomy Bilateral    Surgeon: Chon Ma DO    Assistant:   Mayur Brown    [place] Documentation of Staff:  Surgeon(s) and Role:     * Chon Ma DO - Primary    OR Staff:  First Assistant: Renée Brink  Scrub Person First: Cheryl Sanchez      Anesthesia Type: General  Anesthesia Staff: CRNA: NUNU Brasher CRNA      Complications: None      Estimated Blood Loss: 10 ml  Total IV Fluids:  As per anesthesia  Urine Output: drained with prep    Distention Media: NS (Accurate I/O at the completion of the procedure)    Specimens:   ID Type Source Tests Collected by Time Destination   A : right fallopian tube Tissue Fallopian Tube [address] [place] [address], DO 2021 1028    B : left fallopian tube Tissue Fallopian Tube [address] [facility] [place], DO 2021 1031    C : endometrial currettings Tissue Uterus SURGICAL PATHOLOGY Lequire Darrell,  2021 1045      Implants: * No implants in log *    Drains: * No LDAs found *      Condition: Stable    Findings: Grossly normal uterus, tubes, and ovaries with proliferative endometrium      Sound: 8.5 cm  Net Length: 5.5 cm  Width: 2.7 cm  Power: 82 masterson  Time: 95 seconds    Description of Procedure: The patient was moved to the operative suite where she was placed under general anesthesia by the anesthesiologist.  Time out was preformed. The patient was placed in the dorsal lithotomy position utilizing [employer] The Positioning was checked without stress or pressure on any joints. Her bladder was drained with a echavarria catheter. She was prepped and draped in sterile fashion.  The uterus was sounded to 8.5 cm and a  [employer] manipulator was placed. Gloves were then changed and attention was turned to the abdomen. A 5 millimeter infraumbilical skin incision was made. A varies needle was carefully introduced at a 45 degree angle while tenting the abdominal wall. Intra-abdominal placement was confirmed by use of a water-filled syringe and drop in intra-abdominal pressure with insufflation of CO2. Pneumoperitoneum was obtained with about 2-1/2 L of CO2. A 5 mm step port was then placed without difficulty and intra-abdominal placement was confirmed by laparoscopy. Inspection of the patient's pelvis revealed the findings noted above and underlying structures were noted to be without trauma. Under direct visualization at 5 mm trocar was placed 2 cm above the symphysis pubis in the midline and an 8 mm trocar was placed medial to the right ASIS. Both fallopian tubes were traced from their cornual insertion to the fimbriated ends. The uterus was deviated laterally  with the manipulator to gain access to the bilateral fallopian tubes. The fallopian tube on the left was mobilized and grasped. The mesosalpinx was tented and utilizing the LigaSure in a stepwise fashion it was ligated and transected along the entire length of the fallopian tube. The isthmus of the tube was then ligated and transected in a similar manner. Hemostasis was maintained throughout. The fallopian tube was removed through the port. Excellent hemostasis was achieved. The same procedure was completed on the contralateral side. Re-inspection of the bilateral mesenteric edge and tubal stumps were hemostatic. The bilateral tubes were sent to pathology. All instruments and excess gas was removed from the abdomen and the trocars were removed without difficulty. The skin incisions were closed with 4-0 vicryl in a subcuticular fashion and dressed with steri-strips and a sterile dressing.         A weighted speculum was placed along the posterior vaginal wall and the  cervix was visualized. The uterine manipulator was removed. The cervix was progressively dilated and a hysteroscope was introduced with a proliferative lining being noted. The hysteroscope was then withdrawn and a sharp curettage was performed to sample the endometrium. The cervix was dilated to a # 24 and the NovaSure array was introduced. A seating technique was utilized to find the overall cavity width. A CO2 test was performed and passed. The ablation was then carried out as noted above. At the completion of the ablation the array was withdrawn into its protective sheath and removed from the uterus. The tenaculum was released with hemostasis being noted and the weighted speculum was removed. The patient was awoken from anesthesia. The patient tolerated the procedure well and was taken to PACU in stable condition. All sponge, needle and instrument counts were correct x 2.        Joanna Flight 01/08/21 11:03 AM

## 2021-01-08 NOTE — PROGRESS NOTES
Patient verbalizes readiness for discharge. All criteria met. Discharge Criteria    Inpatients must meet Criteria 1 through 7. All other patients are either YES or N/A. If a NO is chosen then Anesthesia or Surgeon must be notified. 1.  Minimum 30 minutes after last dose of sedative medication, minimum 120 minutes after last dose of reversal agent. Yes      2. Systolic BP stable within 20 mmHg for 30 minutes & systolic BP between 90 & 082 or within 10 mmHg of baseline. Yes      3. Pulse between 60 and 100 or within 10 bpm of baseline. Yes      4. Spontaneous respiratory rate >/= 10 per minute. Yes      5. SaO2 >/= 95 or  >/= baseline. Yes      6. Able to cough and swallow or return to baseline function. Yes      7. Alert and oriented or return to baseline mental status. Yes      8. Demonstrates controlled, coordinated movements, ambulates with steady gait, or return to baseline activity function. Yes      9. Minimal or no pain or nausea, or at a level tolerable and acceptable to patient. Yes      10. Takes and retains oral fluids as allowed. Yes      11. Procedural / perioperative site stable. Minimal or no bleeding. Yes          12. If GI endoscopy procedure, minimal or no abdominal distention or passing flatus. N/A      13. Written discharge instructions and emergency telephone number provided. Yes      14. Accompanied by a responsible adult.     Yes

## 2021-01-08 NOTE — ANESTHESIA PROCEDURE NOTES
Peripheral Block    Patient location during procedure: pre-op  Start time: 1/8/2021 9:29 AM  End time: 1/8/2021 9:39 AM  Staffing  Performed: resident/CRNA   Resident/CRNA: Lavella Gilford, APRN - CRNA  Preanesthetic Checklist  Completed: patient identified, IV checked, site marked, risks and benefits discussed, surgical consent, monitors and equipment checked, pre-op evaluation, timeout performed, anesthesia consent given, oxygen available and patient being monitored  Peripheral Block  Patient position: supine  Prep: ChloraPrep  Patient monitoring: continuous pulse ox, frequent blood pressure checks and IV access  Block type: TAP  Laterality: bilateral  Injection technique: single-shot  Guidance: ultrasound guided  Local infiltration: ropivacaine and decadron (30  ml 5 mg decadron bilateral side)  Infiltration strength: 0.5 %  Dose: 60 mL  Provider prep: mask and sterile gloves  Local infiltration: ropivacaine and decadron (30  ml 5 mg decadron bilateral side)  Needle  Needle type:  Other   Needle gauge: 22 G  Needle localization: anatomical landmarks and ultrasound guidanceOther needle type: [place]  Assessment  Injection assessment: negative aspiration for heme and no paresthesia on injection  Paresthesia pain: none  Slow fractionated injection: yes  Hemodynamics: stable  Reason for block: post-op pain management and at surgeon's request

## 2021-01-11 ENCOUNTER — TELEPHONE (OUTPATIENT)
Dept: OBGYN CLINIC | Age: 36
End: 2021-01-11

## 2021-01-11 NOTE — TELEPHONE ENCOUNTER
Patient called stating that she had a Lap b/l Jed Roche&AILEEN, Jie on 1/8 and now has a head cold. She is coughing and has had a migraine for 2 days due to all the coughing. The cough is productive and the drainage is yellow/brown. She denies any fever and denies loss of taste/smell. Patient is requesting to have an ATB called to [place].   Ok to call something in?

## 2021-01-12 DIAGNOSIS — R05.9 COUGH IN ADULT PATIENT: Primary | ICD-10-CM

## 2021-01-12 LAB — SURGICAL PATHOLOGY REPORT: NORMAL

## 2021-01-12 RX ORDER — BENZONATATE 100 MG/1
100 CAPSULE ORAL 3 TIMES DAILY PRN
Qty: 20 CAPSULE | Refills: 0 | Status: SHIPPED | OUTPATIENT
Start: 2021-01-12 | End: 2021-01-19

## 2021-01-12 NOTE — TELEPHONE ENCOUNTER
Most upper respiratory infections are viral - antibiotic wouldn't help; does she have a f/u next week so we can listen to her lungs if it hasn't passed by then?

## 2021-01-12 NOTE — TELEPHONE ENCOUNTER
Spoke to patient, she is aware of Dr. Abbott Gene response. She still denies any fever and/or loss of taste/smell. Per VO from Dr. Nelsy Madison, patient can have Tessalon Pearles to see if that can help with her cough. Rx e-prescribed to [facility]. Patient agreeable to the plan. She will call if any further issues. Patient verbalized understanding, no further questions/concerns voiced.

## 2021-02-18 ENCOUNTER — OFFICE VISIT (OUTPATIENT)
Dept: OBGYN CLINIC | Age: 36
End: 2021-02-18

## 2021-02-18 VITALS
DIASTOLIC BLOOD PRESSURE: 64 MMHG | SYSTOLIC BLOOD PRESSURE: 96 MMHG | BODY MASS INDEX: 38.49 KG/M2 | WEIGHT: 231 LBS | HEIGHT: 65 IN

## 2021-02-18 DIAGNOSIS — N94.6 DYSMENORRHEA: ICD-10-CM

## 2021-02-18 DIAGNOSIS — N92.0 MENORRHAGIA WITH REGULAR CYCLE: Primary | ICD-10-CM

## 2021-02-18 PROCEDURE — 99024 POSTOP FOLLOW-UP VISIT: CPT | Performed by: OBSTETRICS & GYNECOLOGY

## 2021-02-18 RX ORDER — BUPROPION HYDROCHLORIDE 300 MG/1
TABLET ORAL
COMMUNITY
Start: 2021-01-16

## 2021-02-18 RX ORDER — MONTELUKAST SODIUM 10 MG/1
TABLET ORAL
COMMUNITY
Start: 2021-01-28

## 2021-02-18 RX ORDER — DIVALPROEX SODIUM 250 MG/1
TABLET, DELAYED RELEASE ORAL
COMMUNITY
Start: 2021-02-11

## 2021-02-18 NOTE — PROGRESS NOTES
DATE OF VISIT:  2/18/21    PATIENT NAME:  Roseann Hong     YOB: 1985    REASON FOR VISIT:    Chief Complaint   Patient presents with    Post-Op Check     Pt had D&C, novasure ablation on 1-8-2021. Pt. disappointed because she started her period 2 days ago, it was heavy and she was passing clots the size of a silver dollar. Yesterday had to change her panty liner twice during the day. HISTORY OF PRESENT ILLNESS:  Doing well - started cycle yesterday - lighter today; disc'd ablation expectations and that scarring can continue over 90 days; reviewed pics and path; will return for annual in 3 mos to see how she is doing      Patient's last menstrual period was 02/16/2021. Vitals:    02/18/21 1541   BP: 96/64   Site: Right Upper Arm   Position: Sitting   Weight: 231 lb (104.8 kg)   Height: 5' 5\" (1.651 m)     Body mass index is 38.44 kg/m². Allergies   Allergen Reactions    Nsaids      Pt. Has IBS     Current Outpatient Medications   Medication Sig Dispense Refill    buPROPion (WELLBUTRIN XL) 300 MG extended release tablet       montelukast (SINGULAIR) 10 MG tablet TAKE 1 TABLET BY MOUTH DAILY      BIOTIN PO Take by mouth daily      vitamin D (ERGOCALCIFEROL) 1.25 MG (86672 UT) CAPS capsule       cetirizine (ZYRTEC) 10 MG tablet TK 1 T PO BID      Cyanocobalamin (VITAMIN B12) 1000 MCG TBCR TK 1 T PO D      famotidine (PEPCID) 20 MG tablet Take 20 mg by mouth daily      melatonin 1 MG tablet TAKE 1 TABLET(1 MG) BY MOUTH EVERY NIGHT      metFORMIN (GLUCOPHAGE-XR) 750 MG extended release tablet TK 1 T PO BID      ondansetron (ZOFRAN) 4 MG tablet TK 1 T PO Q 8 H PRF NAUSEA OR VOM      topiramate (TOPAMAX) 25 MG tablet Take 25 mg by mouth 2 times daily      citalopram (CELEXA) 20 MG tablet Take 20 mg by mouth daily      SUMAtriptan (IMITREX) 25 MG tablet TK 1 T PO D PRF MIGRAINE HEADACHE. MAY REPEAT DOSE AFTER 2 H UP TO A.  MAXIMUM  MG IN 24 H  divalproex (DEPAKOTE) 250 MG DR tablet        No current facility-administered medications for this visit. Social History     Socioeconomic History    Marital status:      Spouse name: Not on file    Number of children: Not on file    Years of education: Not on file    Highest education level: Not on file   Occupational History    Not on file   Social Needs    Financial resource strain: Not on file    Food insecurity     Worry: Not on file     Inability: Not on file    Transportation needs     Medical: Not on file     Non-medical: Not on file   Tobacco Use    Smoking status: Never Smoker    Smokeless tobacco: Never Used   Substance and Sexual Activity    Alcohol use: Never     Frequency: Never    Drug use: Never    Sexual activity: Yes     Partners: Male   Lifestyle    Physical activity     Days per week: Not on file     Minutes per session: Not on file    Stress: Not on file   Relationships    Social connections     Talks on phone: Not on file     Gets together: Not on file     Attends Latter day service: Not on file     Active member of club or organization: Not on file     Attends meetings of clubs or organizations: Not on file     Relationship status: Not on file    Intimate partner violence     Fear of current or ex partner: Not on file     Emotionally abused: Not on file     Physically abused: Not on file     Forced sexual activity: Not on file   Other Topics Concern    Not on file   Social History Narrative    Not on file       REVIEW OF SYSTEMS:  Review of Systems   Constitutional: Negative for chills and fever. Genitourinary: Positive for menstrual problem (started cycle). Negative for dysuria and pelvic pain.        PHYSICAL EXAM:  BP 96/64 (Site: Right Upper Arm, Position: Sitting)   Ht 5' 5\" (1.651 m)   Wt 231 lb (104.8 kg)   LMP 02/16/2021   BMI 38.44 kg/m²   Physical Exam  Abdominal:      Comments: Inc c/d/i   Neurological: Mental Status: She is oriented to person, place, and time. ASSESSMENT:      1. Menorrhagia with regular cycle    2. Dysmenorrhea        PLAN:  No orders of the defined types were placed in this encounter.     Return in about 3 months (around 5/18/2021) for annual.       Electronically signed by Abril Arroyo DO on 02/18/21

## (undated) DEVICE — TROCAR: Brand: KII FIOS FIRST ENTRY

## (undated) DEVICE — Z INVALID PART USE 2392642 LARYNGOSCOPE VID TI LO PROF S3 BLDE SPECTRM GLIDESCOPE GO

## (undated) DEVICE — 3M™ TEGADERM™ +PAD FILM DRESSING WITH NON-ADHERENT PAD, 3587, 3-1/2 IN X 4-1/8 IN (9 CM X 10.5 CM), 25/CAR, 4 CAR/CS: Brand: 3M™ TEGADERM™

## (undated) DEVICE — SUTURE VCRL SZ 2-0 L27IN ABSRB VLT L26MM UR-6 5/8 CIR J602H

## (undated) DEVICE — DEVICE MANIP L330MM DIA4.5MM UTER DISP INJ ZINNANTI KRONNER

## (undated) DEVICE — SUTURE VCRL SZ 3-0 L27IN ABSRB UD L26MM SH 1/2 CIR J416H

## (undated) DEVICE — Z DISCONTINUED BY MEDLINE USE 2711682 TRAY SKIN PREP DRY W/ PREM GLV

## (undated) DEVICE — GOWN,SIRUS,POLYRNF,BRTHSLV,XL,30/CS: Brand: MEDLINE

## (undated) DEVICE — BLADELESS OBTURATOR: Brand: WECK VISTA

## (undated) DEVICE — TOWEL,OR,DSP,ST,BLUE,STD,4/PK,20PK/CS: Brand: MEDLINE

## (undated) DEVICE — PAD N ADH W3XL4IN POLY COT SFT PERF FLM EASILY CUT ABSRB

## (undated) DEVICE — Z DISCONTINUED NO SUB IDED DEVICE ABLAT ENDOMET UTER SOUNDING ES SURESOUND NOVASURE

## (undated) DEVICE — MAGNETIC IMPLANT S1000-00: Brand: SOPHONO™

## (undated) DEVICE — CATHETER URETH 16FR L16IN RED RUB INTMIT ROB MOD BARDX

## (undated) DEVICE — HYPODERMIC SAFETY NEEDLE: Brand: MAGELLAN

## (undated) DEVICE — LAVH-LF: Brand: MEDLINE INDUSTRIES, INC.

## (undated) DEVICE — SYRINGE MED 10ML LUERLOCK TIP W/O SFTY DISP

## (undated) DEVICE — Y-TYPE TUR/BLADDER IRRIGATION SET, REGULATING CLAMP

## (undated) DEVICE — PAD,ABDOMINAL,8"X10",ST,LF: Brand: MEDLINE

## (undated) DEVICE — DRAPE SURG LITH HYSTSCP D AND C STD N FEN N REINF W FLD PCH

## (undated) DEVICE — 1000ML,PRESSURE INFUSER W/STOPCOCK: Brand: MEDLINE

## (undated) DEVICE — GOWN,AURORA,NONRNF,XL,30/CS: Brand: MEDLINE

## (undated) DEVICE — SOLUTION IV IRRIG POUR BRL 0.9% SODIUM CHL 2F7124

## (undated) DEVICE — DRAPE,UTILTY,TAPE,15X26, 4EA/PK: Brand: MEDLINE

## (undated) DEVICE — SOLUTION SURG PREP ANTIMICROBIAL 4 OZ SKIN WND EXIDINE

## (undated) DEVICE — [HIGH FLOW INSUFFLATOR,  DO NOT USE IF PACKAGE IS DAMAGED,  KEEP DRY,  KEEP AWAY FROM SUNLIGHT,  PROTECT FROM HEAT AND RADIOACTIVE SOURCES.]: Brand: PNEUMOSURE

## (undated) DEVICE — WARMER SCP 2 ANTIFOG LAP DISP

## (undated) DEVICE — Device

## (undated) DEVICE — ELECTRODE ES AD CRD L15FT DISP FOR PT BELOW 30LB REM

## (undated) DEVICE — COVER LT HNDL BLU PLAS

## (undated) DEVICE — SOLUTION IV 1000ML 0.9% SOD CHL FOR IRRIG PLAS CONT

## (undated) DEVICE — SEALER LAP L37CM MARYLAND JAW OPN NANO COAT MULTIFUNCTIONAL

## (undated) DEVICE — TROCAR ENDOSCP L110MM DIA5MM STD CANN DIL BLDELSS BLNT TIP

## (undated) DEVICE — STRIP,CLOSURE,WOUND,MEDI-STRIP,1/2X4: Brand: MEDLINE

## (undated) DEVICE — CANNULA SEAL

## (undated) DEVICE — PREP PAD BNS: Brand: CONVERTORS

## (undated) DEVICE — SUTURE MCRYL SZ 4-0 L27IN ABSRB UD L19MM PS-2 1/2 CIR PRIM Y426H

## (undated) DEVICE — GLOVE SURG SZ 65 L12IN FNGR THK87MIL WHT LTX FREE